# Patient Record
Sex: MALE | Race: WHITE | Employment: PART TIME | URBAN - NONMETROPOLITAN AREA
[De-identification: names, ages, dates, MRNs, and addresses within clinical notes are randomized per-mention and may not be internally consistent; named-entity substitution may affect disease eponyms.]

---

## 2019-07-11 ENCOUNTER — HOSPITAL ENCOUNTER (EMERGENCY)
Age: 24
Discharge: HOME | End: 2019-07-11
Payer: COMMERCIAL

## 2019-07-11 VITALS
TEMPERATURE: 98.4 F | WEIGHT: 210 LBS | RESPIRATION RATE: 16 BRPM | OXYGEN SATURATION: 100 % | HEART RATE: 112 BPM | DIASTOLIC BLOOD PRESSURE: 86 MMHG | SYSTOLIC BLOOD PRESSURE: 182 MMHG | BODY MASS INDEX: 28.48 KG/M2

## 2019-07-11 DIAGNOSIS — S60.362A INSECT BITE OF LEFT THUMB, INITIAL ENCOUNTER: Primary | ICD-10-CM
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
## 2019-07-11 PROCEDURE — 99281 EMR DPT VST MAYX REQ PHY/QHP: CPT

## 2019-07-11 ASSESSMENT — PAIN DESCRIPTION - LOCATION: LOCATION: HAND

## 2019-07-11 ASSESSMENT — PAIN DESCRIPTION - ORIENTATION: ORIENTATION: LEFT

## 2019-07-11 ASSESSMENT — PAIN DESCRIPTION - DESCRIPTORS: DESCRIPTORS: SPASM

## 2019-07-11 ASSESSMENT — PAIN SCALES - GENERAL: PAINLEVEL_OUTOF10: 6

## 2019-07-11 NOTE — ED PROVIDER NOTES
Never Used   Substance and Sexual Activity    Alcohol use: No    Drug use: None    Sexual activity: None   Lifestyle    Physical activity:     Days per week: None     Minutes per session: None    Stress: None   Relationships    Social connections:     Talks on phone: None     Gets together: None     Attends Nondenominational service: None     Active member of club or organization: None     Attends meetings of clubs or organizations: None     Relationship status: None    Intimate partner violence:     Fear of current or ex partner: None     Emotionally abused: None     Physically abused: None     Forced sexual activity: None   Other Topics Concern    None   Social History Narrative    None       REVIEW OF SYSTEMS     Review of Systems    Except as noted above the remainder of the review of systems was reviewed and is negative. SCREENINGS    Wei Coma Scale  Eye Opening: Spontaneous  Best Verbal Response: Oriented  Best Motor Response: Obeys commands  Wei Coma Scale Score: 15      PHYSICAL EXAM    (up to 7 for level 4, 8 or more for level 5)     ED Triage Vitals [07/11/19 1513]   BP Temp Temp Source Pulse Resp SpO2 Height Weight   (!) 182/86 98.4 °F (36.9 °C) Tympanic 112 16 100 % -- 210 lb (95.3 kg)       Physical Exam  Active and oriented ×3. Nontoxic. No acute distress. Well-hydrated. Head is atraumatic, facies symmetrical.  Respirations nonlabored. Left thumb tip presents with 2 very small puncture wounds no surrounding erythema. These wounds can only be visualized with magnification and lighting but are apparent and appears to be some insect sting or sign of envenomation without any surrounding soft tissue changes. Brisk capillary refill is noted with light touch sensation intact full range of motion of all digits of the left hand. Skin otherwise free of any obvious rashes or lesions. Extremities without edema. Good affect. Pleasant patient.       DIAGNOSTIC RESULTS     none    EMERGENCY DEPARTMENT COURSE andMedical Decision Making:     Vitals:    Vitals:    07/11/19 1513   BP: (!) 182/86   Pulse: 112   Resp: 16   Temp: 98.4 °F (36.9 °C)   TempSrc: Tympanic   SpO2: 100%   Weight: 210 lb (95.3 kg)       MDM/     No sign of infection at this time I do want the patient to watch for increased redness swelling or pain and should become red or swollen it would need to be reevaluated. Strict return precautions and follow up instructions were discussed with the patient with which the patient agrees    ED Medications administered this visit:  Medications - No data to display    CONSULTS: (None if blank)  None    Procedures: (None if blank)       CLINICAL       1.  Insect bite of left thumb, initial encounter          DISPOSITION/PLAN   DISPOSITION Decision To Discharge 07/11/2019 03:38:11 PM      PATIENT REFERRED TO:  Liban Adam MD  [address] [place], [place] [phone]    In 1 day        DISCHARGE MEDICATIONS:  Discharge Medication List as of 7/11/2019  3:39 PM                 (Please note that portions of this note were completed with a voice recognition program.  Efforts were made to edit the dictations but occasionallywords are mis-transcribed.)      Aniket Diop PA-C (electronically signed)  Attending Emergency Department Provider         Hunter Combs II, PA-C  07/11/19 [id number]